# Patient Record
Sex: MALE | Race: AMERICAN INDIAN OR ALASKA NATIVE
[De-identification: names, ages, dates, MRNs, and addresses within clinical notes are randomized per-mention and may not be internally consistent; named-entity substitution may affect disease eponyms.]

---

## 2021-07-31 ENCOUNTER — HOSPITAL ENCOUNTER (INPATIENT)
Dept: HOSPITAL 5 - ED | Age: 31
LOS: 5 days | Discharge: HOME | DRG: 557 | End: 2021-08-05
Attending: INTERNAL MEDICINE | Admitting: HOSPITALIST
Payer: OTHER GOVERNMENT

## 2021-07-31 DIAGNOSIS — N17.0: ICD-10-CM

## 2021-07-31 DIAGNOSIS — M62.82: Primary | ICD-10-CM

## 2021-07-31 DIAGNOSIS — Z79.01: ICD-10-CM

## 2021-07-31 DIAGNOSIS — Z79.891: ICD-10-CM

## 2021-07-31 DIAGNOSIS — Z88.9: ICD-10-CM

## 2021-07-31 DIAGNOSIS — E86.0: ICD-10-CM

## 2021-07-31 DIAGNOSIS — Z79.899: ICD-10-CM

## 2021-07-31 DIAGNOSIS — F23: ICD-10-CM

## 2021-07-31 DIAGNOSIS — R79.89: ICD-10-CM

## 2021-07-31 DIAGNOSIS — F39: ICD-10-CM

## 2021-07-31 LAB
ALBUMIN SERPL-MCNC: 4.7 G/DL (ref 3.9–5)
ALT SERPL-CCNC: 51 UNITS/L (ref 7–56)
BASOPHILS # (AUTO): 0 K/MM3 (ref 0–0.1)
BASOPHILS NFR BLD AUTO: 0.2 % (ref 0–1.8)
BUN SERPL-MCNC: 23 MG/DL (ref 9–20)
BUN SERPL-MCNC: 24 MG/DL (ref 9–20)
BUN/CREAT SERPL: 13 %
BUN/CREAT SERPL: 14 %
CALCIUM SERPL-MCNC: 10.1 MG/DL (ref 8.4–10.2)
CALCIUM SERPL-MCNC: 8.3 MG/DL (ref 8.4–10.2)
EOSINOPHIL # BLD AUTO: 0.1 K/MM3 (ref 0–0.4)
EOSINOPHIL NFR BLD AUTO: 0.6 % (ref 0–4.3)
HCT VFR BLD CALC: 48.6 % (ref 35.5–45.6)
HEMOLYSIS INDEX: 14
HEMOLYSIS INDEX: 15
HGB BLD-MCNC: 15.6 GM/DL (ref 11.8–15.2)
LYMPHOCYTES # BLD AUTO: 1.7 K/MM3 (ref 1.2–5.4)
LYMPHOCYTES NFR BLD AUTO: 17.5 % (ref 13.4–35)
MCHC RBC AUTO-ENTMCNC: 32 % (ref 32–34)
MCV RBC AUTO: 86 FL (ref 84–94)
MONOCYTES # (AUTO): 0.7 K/MM3 (ref 0–0.8)
MONOCYTES % (AUTO): 7.7 % (ref 0–7.3)
PLATELET # BLD: 295 K/MM3 (ref 140–440)
RBC # BLD AUTO: 5.64 M/MM3 (ref 3.65–5.03)

## 2021-07-31 PROCEDURE — U0003 INFECTIOUS AGENT DETECTION BY NUCLEIC ACID (DNA OR RNA); SEVERE ACUTE RESPIRATORY SYNDROME CORONAVIRUS 2 (SARS-COV-2) (CORONAVIRUS DISEASE [COVID-19]), AMPLIFIED PROBE TECHNIQUE, MAKING USE OF HIGH THROUGHPUT TECHNOLOGIES AS DESCRIBED BY CMS-2020-01-R: HCPCS

## 2021-07-31 PROCEDURE — 85025 COMPLETE CBC W/AUTO DIFF WBC: CPT

## 2021-07-31 PROCEDURE — 80048 BASIC METABOLIC PNL TOTAL CA: CPT

## 2021-07-31 PROCEDURE — 82550 ASSAY OF CK (CPK): CPT

## 2021-07-31 PROCEDURE — 36415 COLL VENOUS BLD VENIPUNCTURE: CPT

## 2021-07-31 PROCEDURE — 80053 COMPREHEN METABOLIC PANEL: CPT

## 2021-07-31 PROCEDURE — 71045 X-RAY EXAM CHEST 1 VIEW: CPT

## 2021-07-31 PROCEDURE — 87641 MR-STAPH DNA AMP PROBE: CPT

## 2021-07-31 PROCEDURE — 80320 DRUG SCREEN QUANTALCOHOLS: CPT

## 2021-07-31 PROCEDURE — G0378 HOSPITAL OBSERVATION PER HR: HCPCS

## 2021-07-31 PROCEDURE — 76770 US EXAM ABDO BACK WALL COMP: CPT

## 2021-07-31 PROCEDURE — G0480 DRUG TEST DEF 1-7 CLASSES: HCPCS

## 2021-07-31 NOTE — EMERGENCY DEPARTMENT REPORT
HPI





- General


Chief Complaint: Altered Mental Status


Time Seen by Provider: 07/31/21 15:55





- HPI


HPI: 





This is a 30-year-old -American male who presents to the emergency 

department via EMS for a medical and mental health evaluation after he was brou

ght in for excited delirium.  The patient was seen at work on Tuesday, as a 

[facility] , and [facility] EMS is at bedside after bringing him 

in today.  Apparently the patient did not call in for his part-time job on 

Wednesday.  He spent most of Wednesday, Thursday, Friday in his room.  Today, 

his mother found him standing in the hallway naked just staring.  She called for

EMS and when they arrived he was seen laying in the fetal position in the 

bathroom.  They mentioned about bringing him to the emergency department and he 

became very agitated and aggressive and required 5 mg of Haldol, 5 mg of Versed,

and 50 mg of Benadryl to sedate him and get him to the emergency department.  

The patient has been in the emergency department for about 50 minutes prior to 

my shift starting.  I immediately went into see this patient in room #22 and got

some history from EMS at bedside.  The patient was arousable.  As soon as I 

explained that the patient was brought in for an evaluation with concern for his

mental state, the patient sat up and pulled off all of his monitoring, pulse ox 

and blood pressure cuff.  Initially the patient was unable to tell me the 

appropriate year, but says it was because he just woke up after receiving these 

medications.  After a few minutes the patient was able to tell me the year and 

the current president, and answer orientation questions appropriately.  EMS also

tells me that the mother has some kind of power of .  As we were getting

the mother back to give more collateral information and explain the power of 

 status, the patient eloped from the emergency department.  Our security

was called but had not yet arrived.  [facility] police have been called.  





The patient was seen here in April of this year for a mental health evaluation. 

At that time the patient presented as part of a 1013 that [place] [employer].  At that time he had admitted

to my colleague that he had a history of a mood disorder and was told that he 

was bipolar.  During that emergency department visit, the patient was 

transferred to a psychiatric facility.





If the patient is found and brought back to the emergency department, he will be

a 1013 and ED hold.





I was able to speak with the patient's mother.  She did show me appropriate 

power of  for medical decision making.  She was able to give collateral 

information that does mirror the story told to me by [facility].  Audie arguelles says that once the patient came back Wednesday morning, he did not say another

word until he spoke to me in the emergency department.  She states that he has 

not been acting right.  The patient was found and is currently down the hill at 

[facility] station #1.  Mom says that she would like to 

avoid having the patient brought in by the police and has requested the patient 

receive medication to get him back into the emergency department.  Mom also says

that the patient was previously at [facility] and she will not allow him to

go back to [place] as she feels that he, and her, were not treated appropriately.





ED Past Medical Hx





- Social History


Smoking Status: Never Smoker





ED Review of Systems


ROS: 


Stated complaint: MH EVAL


Other details as noted in HPI





Comment: Unobtainable due to pts medical conditions





Physical Exam





- Physical Exam


Vital Signs: 


                                   Vital Signs











  07/31/21





  15:55


 


O2 Sat by Pulse 100





Oximetry 











Physical Exam: 





GENERAL: The patient is well-developed well-nourished.


HENT: Normocephalic.  Atraumatic.    Patient has moist mucous membranes.


EYES: Extraocular motions are intact.  Pupils equal reactive to light bilatera

lly.


NECK: Supple. Trachea is midline.


CHEST/LUNGS: Clear to auscultation.  There is no respiratory distress noted.


HEART/CARDIOVASCULAR: Regular.  There is no tachycardia.  There is no murmur.


ABDOMEN: Abdomen is soft, nontender.  Patient has normal bowel sounds.  


SKIN: Skin is warm and dry. 


NEURO: Patient is awake and alert.  He is not cooperative.  No obvious motor or 

focal deficits.


MUSCULOSKELETAL: There is no tenderness or deformity.  There is no limitation 

range of motion.  





ED Course


                                   Vital Signs











  07/31/21





  15:55


 


O2 Sat by Pulse 100





Oximetry 














- Reevaluation(s)


Reevaluation #1: 





07/31/21 17:30


The patient was down at the [facility], station #1.  

Initially his family, as well as the fire department, were able to talk with the

patient and get him to return to the emergency department without any further 

medication given.  However, as the patient was walking back towards the 

psychiatric area of the emergency department, he turned around and attempted to 

elope again.  The patient was given 5 mg of Haldol, 2 mg of Ativan, and 25 mg of

Benadryl.  The patient is now back in room #14 and is resting comfortably.  

Blood work has been obtained.  The patient is on a 1013 and an ED hold.


Reevaluation #2: 





07/31/21 19:46


The patient CK level came back at about 2000.  He has some mild renal 

insufficiency with a GFR of 50.  There is a slight elevation in the AST level.  

We do not yet have a urinalysis.  I spoke to the patient and while he may still 

have some sedation from the previous medications, he has initially agreed for an

IV to be placed and to receive IV fluid.  After the patient receives the IV 

fluid we will repeat the CK level and BMP to see if the patient can be medically

cleared versus needing a medical admission.





ED Medical Decision Making





- Lab Data


Result diagrams: 


                                 07/31/21 17:55





                                 07/31/21 22:30





                                   Lab Results











  07/31/21 07/31/21 07/31/21 Range/Units





  17:55 17:55 17:55 


 


WBC  9.6    (4.5-11.0)  K/mm3


 


RBC  5.64 H    (3.65-5.03)  M/mm3


 


Hgb  15.6 H    (11.8-15.2)  gm/dl


 


Hct  48.6 H    (35.5-45.6)  %


 


MCV  86    (84-94)  fl


 


MCH  28    (28-32)  pg


 


MCHC  32    (32-34)  %


 


RDW  13.5    (13.2-15.2)  %


 


Plt Count  295    (140-440)  K/mm3


 


Lymph % (Auto)  17.5    (13.4-35.0)  %


 


Mono % (Auto)  7.7 H    (0.0-7.3)  %


 


Eos % (Auto)  0.6    (0.0-4.3)  %


 


Baso % (Auto)  0.2    (0.0-1.8)  %


 


Lymph # (Auto)  1.7    (1.2-5.4)  K/mm3


 


Mono # (Auto)  0.7    (0.0-0.8)  K/mm3


 


Eos # (Auto)  0.1    (0.0-0.4)  K/mm3


 


Baso # (Auto)  0.0    (0.0-0.1)  K/mm3


 


Seg Neutrophils %  74.0 H    (40.0-70.0)  %


 


Seg Neutrophils #  7.1    (1.8-7.7)  K/mm3


 


Sodium   136 L   (137-145)  mmol/L


 


Potassium   3.4 L   (3.6-5.0)  mmol/L


 


Chloride   96.0 L   ()  mmol/L


 


Carbon Dioxide   13 L   (22-30)  mmol/L


 


Anion Gap   30   mmol/L


 


BUN   24 H   (9-20)  mg/dL


 


Creatinine   1.9 H   (0.8-1.3)  mg/dL


 


Estimated GFR   50   ml/min


 


BUN/Creatinine Ratio   13   %


 


Glucose   113 H   ()  mg/dL


 


Calcium   10.1   (8.4-10.2)  mg/dL


 


Total Bilirubin   1.10   (0.1-1.2)  mg/dL


 


AST   76 H   (5-40)  units/L


 


ALT   51   (7-56)  units/L


 


Alkaline Phosphatase   58   ()  units/L


 


Total Creatine Kinase     ()  units/L


 


Total Protein   7.8   (6.3-8.2)  g/dL


 


Albumin   4.7   (3.9-5)  g/dL


 


Albumin/Globulin Ratio   1.5   %


 


Plasma/Serum Alcohol    < 0.01  (0-0.07)  %














  07/31/21 07/31/21 07/31/21 Range/Units





  17:55 22:30 22:30 


 


WBC     (4.5-11.0)  K/mm3


 


RBC     (3.65-5.03)  M/mm3


 


Hgb     (11.8-15.2)  gm/dl


 


Hct     (35.5-45.6)  %


 


MCV     (84-94)  fl


 


MCH     (28-32)  pg


 


MCHC     (32-34)  %


 


RDW     (13.2-15.2)  %


 


Plt Count     (140-440)  K/mm3


 


Lymph % (Auto)     (13.4-35.0)  %


 


Mono % (Auto)     (0.0-7.3)  %


 


Eos % (Auto)     (0.0-4.3)  %


 


Baso % (Auto)     (0.0-1.8)  %


 


Lymph # (Auto)     (1.2-5.4)  K/mm3


 


Mono # (Auto)     (0.0-0.8)  K/mm3


 


Eos # (Auto)     (0.0-0.4)  K/mm3


 


Baso # (Auto)     (0.0-0.1)  K/mm3


 


Seg Neutrophils %     (40.0-70.0)  %


 


Seg Neutrophils #     (1.8-7.7)  K/mm3


 


Sodium   137   (137-145)  mmol/L


 


Potassium   4.1  D   (3.6-5.0)  mmol/L


 


Chloride   105.6   ()  mmol/L


 


Carbon Dioxide   22  D   (22-30)  mmol/L


 


Anion Gap   14   mmol/L


 


BUN   23 H   (9-20)  mg/dL


 


Creatinine   1.7 H   (0.8-1.3)  mg/dL


 


Estimated GFR   57   ml/min


 


BUN/Creatinine Ratio   14   %


 


Glucose   84   ()  mg/dL


 


Calcium   8.3 L D   (8.4-10.2)  mg/dL


 


Total Bilirubin     (0.1-1.2)  mg/dL


 


AST     (5-40)  units/L


 


ALT     (7-56)  units/L


 


Alkaline Phosphatase     ()  units/L


 


Total Creatine Kinase  1925 H   3416 H  ()  units/L


 


Total Protein     (6.3-8.2)  g/dL


 


Albumin     (3.9-5)  g/dL


 


Albumin/Globulin Ratio     %


 


Plasma/Serum Alcohol     (0-0.07)  %














- Medical Decision Making





The patient was initially brought in by EMS after his mother called 911 for what

appears to be some psychosis.  As previously stated, the patient became very 

agitated and aggressive when EMS arrived and discussed coming to the hospital.  

The patient required multiple medications for excited delirium.  Initially I saw

the patient in bed #21.  Despite the sedation, the patient was easily arousable.

 After introducing myself as the emergency department physician and discussing 

both a medical and psychiatric evaluation, the patient once again became very 

agitated, began cursing at myself and ER staff, and then eloped from the 

emergency department.  The patient was followed by his [facility] colleagues and they 

were able to get him into station #1 just down the hill.  Then, along with his 

parents, they were able to get him to return to the emergency department.  

However, he did require another dose of medications once he saw that he was 

going to the psychiatric portion of the emergency department.  The patient was 

placed on a 1013 and ED hold for what appears to be acute psychosis.





The patient's blood work came back showing some renal insufficiency with a 

creatinine of 1.9, GFR of 50.  He had some elevation in his AST, and the patient

had some hypokalemia.  With these lab values, and the fact the patient remained 

in his room at his home for about 3 days, followed by some excited delirium 

requiring medications, I had concern for possible rhabdomyolysis.  His initial 

CK level came back at 1900.  The patient was then given 2 L of IV fluid.  His 

labs were rechecked and while the renal insufficiency improved, his CK level 

went up to 3400.  I have concerned that his CK level will continue to increase 

over the next 24 to 48 hours.  The patient will need further IV fluid resuscit

ation, repeat labs to monitor his kidney function and CK level, and therefore is

not able to be medically cleared for psychiatric placement at this time.  The 

patient will be a medical admission but will remain as a 1013 and will be seen 

by the psychiatric team for further evaluation and treatment and possible 

placement.  The patient has been accepted for admission by the hospitalist, Dr. Winn.  I also spoke with the patient's mother, his power of , and 

updated her regarding the medical admission.


Critical Care Time: No


Critical care attestation.: 


If time is entered above; I have spent that time in minutes in the direct care 

of this critically ill patient, excluding procedure time.








ED Disposition


Clinical Impression: 


 Rhabdomyolysis, Mild renal insufficiency, Acute psychosis, LFT elevation, 

Dehydration





Disposition: DC-09 OP ADMIT IP TO THIS HOSP


Is pt being admited?: Yes


Condition: Fair


Time of Disposition: 00:12

## 2021-08-01 RX ADMIN — FAMOTIDINE SCH MG: 20 TABLET ORAL at 22:00

## 2021-08-01 RX ADMIN — SODIUM CHLORIDE SCH MLS/HR: 0.9 INJECTION, SOLUTION INTRAVENOUS at 16:19

## 2021-08-01 RX ADMIN — FLUOXETINE SCH MG: 10 CAPSULE ORAL at 13:11

## 2021-08-01 RX ADMIN — Medication SCH ML: at 10:37

## 2021-08-01 RX ADMIN — HEPARIN SODIUM SCH: 5000 INJECTION, SOLUTION INTRAVENOUS; SUBCUTANEOUS at 10:37

## 2021-08-01 RX ADMIN — FAMOTIDINE SCH MG: 20 TABLET ORAL at 10:37

## 2021-08-01 RX ADMIN — Medication SCH ML: at 22:00

## 2021-08-01 NOTE — XRAY REPORT
CHEST 1 VIEW



INDICATION: 

Altered mental status.



COMPARISON: 

None.



FINDINGS:



Support devices: None.

Heart: Normal. 

Lungs/Pleura: No acute pulmonary or pleural findings.  







IMPRESSION:

1. No acute findings.



Signer Name: Harry Edwards MD 

Signed: 8/1/2021 8:17 AM

Workstation Name: [id number]

## 2021-08-01 NOTE — HISTORY AND PHYSICAL REPORT
History of Present Illness


Date of examination: 08/01/21


Date of admission: 


08/01/2021


Chief complaint: 





Altered mental status


History of present illness: 





30-year-old -American male who presents to the emergency department via 

EMS for a medical and mental health evaluation after he was brought in for 

excited delirium.  The patient was seen at work on Tuesday, as a [facility] 

, and [facility] EMS is at bedside after bringing him in today.  Norma

arently the patient did not call in for his part-time job on Wednesday.  He 

spent most of Wednesday, Thursday, Friday in his room.  Today, his mother found 

him standing in the hallway naked just staring.  She called for EMS and when 

they arrived he was seen laying in the fetal position in the bathroom.  They 

mentioned about bringing him to the emergency department and he became very 

agitated and aggressive and required 5 mg of Haldol, 5 mg of Versed, and 50 mg 

of Benadryl to sedate him and get him to the emergency department.  The patient 

has been in the emergency department for about 50 minutes prior to my shift 

starting.  I immediately went into see this patient in room #22 and got some hi

story from EMS at bedside.  The patient was arousable.  As soon as I explained 

that the patient was brought in for an evaluation with concern for his mental 

state, the patient sat up and pulled off all of his monitoring, pulse ox and 

blood pressure cuff.  Initially the patient was unable to tell me the 

appropriate year, but says it was because he just woke up after receiving these 

medications.  After a few minutes the patient was able to tell me the year and 

the current president, and answer orientation questions appropriately.  EMS also

tells me that the mother has some kind of power of .  As we were getting

the mother back to give more collateral information and explain the power of 

 status, the patient eloped from the emergency department.  Our security

was called but had not yet arrived.  [facility] police have been called.  





The patient was seen here in April of this year for a mental health evaluation. 

At that time the patient presented as part of a 1013 that was done [place] [employer].  At that time he had admitted

to my colleague that he had a history of a mood disorder and was told that he 

was bipolar.  During that emergency department visit, the patient was 

transferred to a psychiatric facility.





In the emergency room patient is found to have 1013.  Also patient found to have

rhabdomyolysis CKs 3416, BUN 23 creatinine 1.7





Past History


Past Medical History: other (Psychosis, bipolar disorder)





Medications and Allergies


                                    Allergies











Allergy/AdvReac Type Severity Reaction Status Date / Time


 


pollen extracts Allergy Mild Unknown Verified 04/02/21 13:58











Active Meds: 


Active Medications





Acetaminophen (Acetaminophen 325 Mg Tab)  650 mg PO Q4H PRN


   PRN Reason: Pain MILD(1-3)/Fever >100.5/HA


Albuterol (Albuterol 2.5 Mg/3 Ml Nebu)  2.5 mg IH Q4HRT PRN


   PRN Reason: Shortness Of Breath


Famotidine (Famotidine 20 Mg Tab)  20 mg PO BID JULI


Heparin Sodium (Porcine) (Heparin 5,000 Unit/1 Ml Vial)  5,000 unit SUB-Q Q12HR 

JULI


Sodium Chloride (Nacl 0.9% 1000 Ml)  1,000 mls @ 999 mls/hr IV BOLUS ONE


   Stop: 08/01/21 00:48


Sodium Chloride (Nacl 0.45% 1000 Ml)  1,000 mls @ 125 mls/hr IV AS DIRECT JULI


Ondansetron HCl (Ondansetron 4 Mg/2 Ml Inj)  4 mg IV Q8H PRN


   PRN Reason: Nausea And Vomiting


Sodium Chloride (Sodium Chloride 0.9% 10 Ml Flush Syringe)  10 ml IV BID JULI


Sodium Chloride (Sodium Chloride 0.9% 10 Ml Flush Syringe)  10 ml IV PRN PRN


   PRN Reason: LINE FLUSH











Review of Systems


Constitutional: other (Altered mental status)


Neurological: change in mentation


Psychiatric: anxiety





Exam





- Constitutional


Vitals: 


                                        











Temp Pulse Resp BP Pulse Ox


 


    103 H  13   92/43   98 


 


    07/31/21 16:00  07/31/21 16:00  07/31/21 16:00  07/31/21 16:00











General appearance: Present: no acute distress, well-nourished





- EENT


Eyes: Present: PERRL


ENT: hearing intact, clear oral mucosa





- Neck


Neck: Present: supple, normal ROM





- Respiratory


Respiratory effort: normal


Respiratory: bilateral: CTA





- Cardiovascular


Heart Sounds: Present: S1 & S2.  Absent: rub, click





- Extremities


Extremities: pulses symmetrical, No edema


Peripheral Pulses: within normal limits





- Abdominal


General gastrointestinal: Present: soft, non-tender, non-distended, normal bowel

sounds


Male genitourinary: Present: normal





- Integumentary


Integumentary: Present: clear, warm, dry





- Musculoskeletal


Musculoskeletal: gait normal, strength equal bilaterally





- Psychiatric


Psychiatric: appropriate mood/affect, intact judgment & insight





- Neurologic


Neurologic: CNII-XII intact, moves all extremities





Results





- Labs


CBC & Chem 7: 


                                 07/31/21 17:55





                                 07/31/21 22:30


Labs: 


                             Laboratory Last Values











WBC  9.6 K/mm3 (4.5-11.0)   07/31/21  17:55    


 


RBC  5.64 M/mm3 (3.65-5.03)  H  07/31/21  17:55    


 


Hgb  15.6 gm/dl (11.8-15.2)  H  07/31/21  17:55    


 


Hct  48.6 % (35.5-45.6)  H  07/31/21  17:55    


 


MCV  86 fl (84-94)   07/31/21  17:55    


 


MCH  28 pg (28-32)   07/31/21  17:55    


 


MCHC  32 % (32-34)   07/31/21  17:55    


 


RDW  13.5 % (13.2-15.2)   07/31/21  17:55    


 


Plt Count  295 K/mm3 (140-440)   07/31/21  17:55    


 


Lymph % (Auto)  17.5 % (13.4-35.0)   07/31/21  17:55    


 


Mono % (Auto)  7.7 % (0.0-7.3)  H  07/31/21  17:55    


 


Eos % (Auto)  0.6 % (0.0-4.3)   07/31/21  17:55    


 


Baso % (Auto)  0.2 % (0.0-1.8)   07/31/21  17:55    


 


Lymph # (Auto)  1.7 K/mm3 (1.2-5.4)   07/31/21  17:55    


 


Mono # (Auto)  0.7 K/mm3 (0.0-0.8)   07/31/21  17:55    


 


Eos # (Auto)  0.1 K/mm3 (0.0-0.4)   07/31/21  17:55    


 


Baso # (Auto)  0.0 K/mm3 (0.0-0.1)   07/31/21  17:55    


 


Seg Neutrophils %  74.0 % (40.0-70.0)  H  07/31/21  17:55    


 


Seg Neutrophils #  7.1 K/mm3 (1.8-7.7)   07/31/21  17:55    


 


Sodium  137 mmol/L (137-145)   07/31/21  22:30    


 


Potassium  4.1 mmol/L (3.6-5.0)  D 07/31/21  22:30    


 


Chloride  105.6 mmol/L ()   07/31/21  22:30    


 


Carbon Dioxide  22 mmol/L (22-30)  D 07/31/21  22:30    


 


Anion Gap  14 mmol/L  07/31/21  22:30    


 


BUN  23 mg/dL (9-20)  H  07/31/21  22:30    


 


Creatinine  1.7 mg/dL (0.8-1.3)  H  07/31/21  22:30    


 


Estimated GFR  57 ml/min  07/31/21  22:30    


 


BUN/Creatinine Ratio  14 %  07/31/21  22:30    


 


Glucose  84 mg/dL ()   07/31/21  22:30    


 


Calcium  8.3 mg/dL (8.4-10.2)  L D 07/31/21  22:30    


 


Total Bilirubin  1.10 mg/dL (0.1-1.2)   07/31/21  17:55    


 


AST  76 units/L (5-40)  H  07/31/21  17:55    


 


ALT  51 units/L (7-56)   07/31/21  17:55    


 


Alkaline Phosphatase  58 units/L ()   07/31/21  17:55    


 


Total Creatine Kinase  3416 units/L ()  H  07/31/21  22:30    


 


Total Protein  7.8 g/dL (6.3-8.2)   07/31/21  17:55    


 


Albumin  4.7 g/dL (3.9-5)   07/31/21  17:55    


 


Albumin/Globulin Ratio  1.5 %  07/31/21  17:55    


 


Plasma/Serum Alcohol  < 0.01 % (0-0.07)   07/31/21  17:55    














Assessment and Plan


VTE prophylaxis?: Chemical


Plan of care discussed with patient/family: Yes





- Patient Problems


(1) Rhabdomyolysis


Current Visit: Yes   Status: Acute   


Plan to address problem: 


Admit the patient to the medical floor.  Regular diet.  Half-normal saline at 

the rate of 125 cc/h.  We will recheck the CK in the morning.  Repeat BMP in the

morning.  Nephrology consult








(2) Acute psychosis


Current Visit: Yes   Status: Acute   


Plan to address problem: 


Admit the patient to the medical floor.  Patient is 1013.  We consults 

psychiatry evaluation.  Medication as per psych recommendation








(3) Dehydration


Current Visit: Yes   Status: Acute   


Plan to address problem: 


Half-normal saline at the rate of 125 cc/h.  We will recheck the CK in the 

morning.  Repeat BMP in the morning.  Nephrology consult








(4) LFT elevation


Current Visit: Yes   Status: Acute   


Plan to address problem: 


We will recheck the CMP in the morning if needed will consult GI








(5) CIARRA (acute kidney injury)


Current Visit: Yes   Status: Acute   


Plan to address problem: 


Half-normal saline at the rate of 125 cc/h.  Avoid nephrotoxic drug.  Renally 

dose medication.  Repeat BMP in the morning.  Nephrology consult








(6) DVT prophylaxis


Current Visit: Yes   Status: Acute   


Plan to address problem: 


Heparin 5000 units subcu every 12 hours for DVT prophylaxis.  Pepcid 20 mg p.o. 

twice daily for GI prophylaxis.  Patient is a full code

## 2021-08-01 NOTE — CONSULTATION
History of Present Illness





- Reason for Consult


Consult date: 08/01/21


acute renal failure





- History of Present Illness





30-year-old -American male who presents to the emergency department via 

EMS for a medical and mental health evaluation after he was brought in for 

excited delirium.  in the ER he became very agitated and aggressive and required

5 mg of Haldol, 5 mg of Versed, and 50 mg of Benadryl to sedate him and get him 

to the emergency department. 





In the emergency room patient is found to have 1013.  Also patient found to have

rhabdomyolysis CKs 3416, BUN 23 creatinine 1.7 and renal consult was requested








Past History


Past Medical History: other (Psychosis, bipolar disorder)





Medications and Allergies


                                    Allergies











Allergy/AdvReac Type Severity Reaction Status Date / Time


 


pollen extracts Allergy Mild Unknown Verified 04/02/21 13:58











Active Meds: 


Active Medications





Acetaminophen (Acetaminophen 325 Mg Tab)  650 mg PO Q4H PRN


   PRN Reason: Pain MILD(1-3)/Fever >100.5/HA


Albuterol (Albuterol 2.5 Mg/3 Ml Nebu)  2.5 mg IH Q4HRT PRN


   PRN Reason: Shortness Of Breath


Famotidine (Famotidine 20 Mg Tab)  20 mg PO BID JULI


Heparin Sodium (Porcine) (Heparin 5,000 Unit/1 Ml Vial)  5,000 unit SUB-Q Q12HR 

JULI


Sodium Chloride (Nacl 0.45% 1000 Ml)  1,000 mls @ 125 mls/hr IV AS DIRECT JULI


Ondansetron HCl (Ondansetron 4 Mg/2 Ml Inj)  4 mg IV Q8H PRN


   PRN Reason: Nausea And Vomiting


Sodium Chloride (Sodium Chloride 0.9% 10 Ml Flush Syringe)  10 ml IV BID JULI


Sodium Chloride (Sodium Chloride 0.9% 10 Ml Flush Syringe)  10 ml IV PRN PRN


   PRN Reason: LINE FLUSH











Review of Systems


ROS unobtainable: due to mental status





Exam





- Vital Signs


Vital signs: 


                                   Vital Signs











Pulse Ox


 


 100 


 


 07/31/21 15:22














- General Appearance


General appearance: well-developed, well-nourished


EENT: ATNC, mucous membranes dry


Neck: Present: neck supple


Respiratory: Clear to Ascultation


Heart: tachycardia


Gastrointestinal: Present: normoactive bowel sounds


Integumentary: no rash, warm and dry


Neurologic: other (does not follow commands)


Musculoskeletal: Present: other (no edema in BLE)





Results





- Lab Results





                                 07/31/21 17:55





                                 07/31/21 22:30


                             Most recent lab results











Calcium  8.3 mg/dL (8.4-10.2)  L D 07/31/21  22:30    














Assessment and Plan


(1) Rhabdomyolysis


(2) Acute psychosis


(3) Dehydration


(4) LFT elevation


(5) CIARRA (acute kidney injury)








kidney function is normal at baseline, CIARRA 2/2 prerenal azotemia


cont  cc/h


will check renal US


will check urine lytes and protein


will cont to monitor CK, it is trending down


renally dose meds


strict I&O


daily weight





Asif Fajardo MD


[phone]

## 2021-08-01 NOTE — ULTRASOUND REPORT
ULTRASOUND RENAL



INDICATION:

renal failure.



COMPARISON:

No relevant prior imaging study available. 



FINDINGS:

RIGHT KIDNEY: 

Size: 11.2 cm. 

Echogenicity: Increased. 

Cortical thickness: Normal.

Stones: None. 

Hydronephrosis: None. 

Cyst or mass: None.  



LEFT KIDNEY: 

Size: 11.1 cm. 

Echogenicity: Increased. 

Cortical thickness: Normal. 

Stones: None. 

Hydronephrosis: None. 

Cyst or mass: None.  



Urinary Bladder: No significant abnormality.

Free Fluid: None.

Additional Findings: None.



IMPRESSION

1. No acute sonographic abnormality of the kidneys. Increased echogenicity in the cortex of both kidn
eys can be seen in the setting of medical renal disease.



Signer Name: Harry Edwards MD 

Signed: 8/1/2021 11:17 AM

Workstation Name: [id number]

## 2021-08-01 NOTE — EVENT NOTE
Date: 08/01/21


Patient presented with altered mental status, acute kidney injury, 

rhabdomyolysis. I have seen and examined him. Continue current management. Psych

consulted.

## 2021-08-01 NOTE — CONSULTATION
History of Present Illness





- Reason for Consult


Consult date: 08/01/21


Reason for consult: AMS





- History of Present Psychiatric Illness


Per ER Note: This is a 30-year-old -American male who presents to the 

emergency department via EMS for a medical and mental health evaluation after he

was brought in for excited delirium.  The patient was seen at work on Tuesday, 

as a [facility] , and [facility] EMS is at bedside after bringing

him in today.  Apparently the patient did not call in for his part-time job on 

Wednesday.  He spent most of Wednesday, Thursday, Friday in his room.  Today, 

his mother found him standing in the hallway naked just staring.  She called for

EMS and when they arrived he was seen laying in the fetal position in the 

bathroom.  They mentioned about bringing him to the emergency department and he 

became very agitated and aggressive and required 5 mg of Haldol, 5 mg of Versed,

and 50 mg of Benadryl to sedate him and get him to the emergency department.  

The patient has been in the emergency department for about 50 minutes prior to 

my shift starting.  I immediately went into see this patient in room #22 and got

some history from EMS at bedside.  The patient was arousable.  As soon as I 

explained that the patient was brought in for an evaluation with concern for his

mental state, the patient sat up and pulled off all of his monitoring, pulse ox 

and blood pressure cuff.  Initially the patient was unable to tell me the 

appropriate year, but says it was because he just woke up after receiving these 

medications.  After a few minutes the patient was able to tell me the year and 

the current president, and answer orientation questions appropriately.  EMS also

tells me that the mother has some kind of power of .  As we were getting

the mother back to give more collateral information and explain the power of 

 status, the patient eloped from the emergency department.  Our security

was called but had not yet arrived.  [facility] police have been called.  





During my evaluation of 31y/o Saleem Lockwood, he is lying in n his back staring

at the ceiling. He doesn't look at me or acknowledge me when I walk up. The 

patient does speak to me but he continues to stare at the ceiling. He is a/o x 

3. He doesn't appear to be forthcoming about what is really going on with him. 

He says his mom called 911 but he's unsure why. He says "she did it without 

speaking to me." I asked the patient why did his mother think he needed medical 

attention, he replies "I don't know. I'm not sure why she would call 911." I ask

him what was going on at the time when 911 was called. The patient says "I was 

in the bathroom praying out loud to God. It wasn't meant for her to hear." When 

asking the patient what he was praying out loud about he states "I don't 

remember. It was for God." The patient denies hallucinations when asked, but I 

ask him why is he staring at the ceiling and not looking at me, he replies "I'm 

looking at God. I only need to see him." The patient denies SI/HI. He says he 

did have an attempt "in his college days." He then says "I have felt suicidal on

up til now." When asking the patient again was he suicidal he replies "no." He 

says he was recently admitted to [facility] on Good Friday of this year he 

says for Mood Disorder. He denies taking any medications, he says "because I 

don't need them."





Diagnoses: Mood disorder


Suicide attempts or Self-harm behavior: Yes


Prior psychiatric hospitalizations: Yes


Substance Abuse history: Denies


Previous psychiatric medications tried: did not take them


Outpatient treatment: Denies





PAST MEDICAL HISTORY: None reported





Family Psychiatric History: None reported or documented





SOCIAL HISTORY


Marital Status: Single


Living Arrangements: with mother


Employment Status: Employed


Access to guns/weapons: Denies 


Education: high school


History of Abuse: none reported


Legal History: none reported





REVIEW OF SYSTEMS


Constitutional: Negative for weight loss


ENT: Negative for stridor


Respiratory: Negative for cough or hemoptysis


All other systems reviewed and are negative


 


MENTAL STATUS EXAMINATION


General Appearance and Behavior: Age appropriate, good hygiene, wearing 

appropriate clothes, calm, cooperative, no eye contact


Cooperation: Participating, guarded


Psychomotor Behavior: normal


Mood: okay


Affect and affective range: Restricted


Thought Process: goal oriented


Thought Content: None


Speech: Normal volume, Regular rate and rhythm, 


Suicidal Ideation: Unclear


Homicidal Ideation: Denies 


Hallucinations: Visual


Delusions: None elicited


Impulse Control: Normal


Insight and Judgment: Limited insight and judgment


Memory: Limited


Attention: Limited


Orientation: Alert, oriented





 Assessment and Plan 


(1) Acute Psychosis





Treatment Plan


1013


Olanzapine 5mg po daily


Prozac 10mg po daily


Trazodone 50mg po qhs


Medical: Per primary


Sitter: Defer to primary


Disposition: Recommend acute psychiatric inpatient treatment. 


Will follow. Thanks.


Case staffed with Dr. Pederson. 








Medications and Allergies


                                    Allergies











Allergy/AdvReac Type Severity Reaction Status Date / Time


 


pollen extracts Allergy Mild Unknown Verified 04/02/21 13:58











Active Meds: 


Active Medications





Acetaminophen (Acetaminophen 325 Mg Tab)  650 mg PO Q4H PRN


   PRN Reason: Pain MILD(1-3)/Fever >100.5/HA


Albuterol (Albuterol 2.5 Mg/3 Ml Nebu)  2.5 mg IH Q4HRT PRN


   PRN Reason: Shortness Of Breath


Famotidine (Famotidine 20 Mg Tab)  20 mg PO BID [facility]


   Last Admin: 08/01/21 10:37 Dose:  20 mg


   Documented by: 


Heparin Sodium (Porcine) (Heparin 5,000 Unit/1 Ml Vial)  5,000 unit SUB-Q Q12HR 

[facility]


   Last Admin: 08/01/21 10:37 Dose:  Not Given


   Documented by: 


Sodium Chloride (Nacl 0.45% 1000 Ml)  1,000 mls @ 125 mls/hr IV AS DIRECT [facility]


Ondansetron HCl (Ondansetron 4 Mg/2 Ml Inj)  4 mg IV Q8H PRN


   PRN Reason: Nausea And Vomiting


Sodium Chloride (Sodium Chloride 0.9% 10 Ml Flush Syringe)  10 ml IV BID [facility]


   Last Admin: 08/01/21 10:37 Dose:  10 ml


   Documented by: 


Sodium Chloride (Sodium Chloride 0.9% 10 Ml Flush Syringe)  10 ml IV PRN PRN


   PRN Reason: LINE FLUSH











Mental Status Exam





- Vital signs


                                Last Vital Signs











Temp  98.6 F   08/01/21 08:38


 


Pulse  92 H  08/01/21 08:38


 


Resp  18   08/01/21 08:38


 


BP  119/72   08/01/21 08:38


 


Pulse Ox  95   08/01/21 09:24














Results


Result Diagrams: 


                                 07/31/21 17:55





                                 07/31/21 22:30


                              Abnormal lab results











  07/31/21 07/31/21 07/31/21 Range/Units





  17:55 17:55 17:55 


 


RBC  5.64 H    (3.65-5.03)  M/mm3


 


Hgb  15.6 H    (11.8-15.2)  gm/dl


 


Hct  48.6 H    (35.5-45.6)  %


 


Mono % (Auto)  7.7 H    (0.0-7.3)  %


 


Seg Neutrophils %  74.0 H    (40.0-70.0)  %


 


Sodium   136 L   (137-145)  mmol/L


 


Potassium   3.4 L   (3.6-5.0)  mmol/L


 


Chloride   96.0 L   ()  mmol/L


 


Carbon Dioxide   13 L   (22-30)  mmol/L


 


BUN   24 H   (9-20)  mg/dL


 


Creatinine   1.9 H   (0.8-1.3)  mg/dL


 


Glucose   113 H   ()  mg/dL


 


Calcium     (8.4-10.2)  mg/dL


 


AST   76 H   (5-40)  units/L


 


Total Creatine Kinase    1925 H  ()  units/L














  07/31/21 07/31/21 Range/Units





  22:30 22:30 


 


RBC    (3.65-5.03)  M/mm3


 


Hgb    (11.8-15.2)  gm/dl


 


Hct    (35.5-45.6)  %


 


Mono % (Auto)    (0.0-7.3)  %


 


Seg Neutrophils %    (40.0-70.0)  %


 


Sodium    (137-145)  mmol/L


 


Potassium    (3.6-5.0)  mmol/L


 


Chloride    ()  mmol/L


 


Carbon Dioxide    (22-30)  mmol/L


 


BUN  23 H   (9-20)  mg/dL


 


Creatinine  1.7 H   (0.8-1.3)  mg/dL


 


Glucose    ()  mg/dL


 


Calcium  8.3 L D   (8.4-10.2)  mg/dL


 


AST    (5-40)  units/L


 


Total Creatine Kinase   3416 H  ()  units/L








All other labs normal.

## 2021-08-02 LAB
ALBUMIN SERPL-MCNC: 3.7 G/DL (ref 3.9–5)
ALT SERPL-CCNC: 55 UNITS/L (ref 7–56)
BASOPHILS # (AUTO): 0 K/MM3 (ref 0–0.1)
BASOPHILS NFR BLD AUTO: 0.9 % (ref 0–1.8)
BUN SERPL-MCNC: 12 MG/DL (ref 9–20)
BUN/CREAT SERPL: 10 %
CALCIUM SERPL-MCNC: 8.8 MG/DL (ref 8.4–10.2)
EOSINOPHIL # BLD AUTO: 0.2 K/MM3 (ref 0–0.4)
EOSINOPHIL NFR BLD AUTO: 3.5 % (ref 0–4.3)
HCT VFR BLD CALC: 40.3 % (ref 35.5–45.6)
HEMOLYSIS INDEX: 8
HGB BLD-MCNC: 13.5 GM/DL (ref 11.8–15.2)
LYMPHOCYTES # BLD AUTO: 1.5 K/MM3 (ref 1.2–5.4)
LYMPHOCYTES NFR BLD AUTO: 31.9 % (ref 13.4–35)
MCHC RBC AUTO-ENTMCNC: 34 % (ref 32–34)
MCV RBC AUTO: 85 FL (ref 84–94)
MONOCYTES # (AUTO): 0.3 K/MM3 (ref 0–0.8)
MONOCYTES % (AUTO): 7.1 % (ref 0–7.3)
PLATELET # BLD: 231 K/MM3 (ref 140–440)
RBC # BLD AUTO: 4.74 M/MM3 (ref 3.65–5.03)

## 2021-08-02 RX ADMIN — FAMOTIDINE SCH MG: 20 TABLET ORAL at 22:26

## 2021-08-02 RX ADMIN — HEPARIN SODIUM SCH: 5000 INJECTION, SOLUTION INTRAVENOUS; SUBCUTANEOUS at 11:24

## 2021-08-02 RX ADMIN — HEPARIN SODIUM SCH: 5000 INJECTION, SOLUTION INTRAVENOUS; SUBCUTANEOUS at 08:25

## 2021-08-02 RX ADMIN — HEPARIN SODIUM SCH UNIT: 5000 INJECTION, SOLUTION INTRAVENOUS; SUBCUTANEOUS at 22:26

## 2021-08-02 RX ADMIN — FLUOXETINE SCH MG: 10 CAPSULE ORAL at 11:24

## 2021-08-02 RX ADMIN — Medication SCH ML: at 11:24

## 2021-08-02 RX ADMIN — FAMOTIDINE SCH MG: 20 TABLET ORAL at 11:24

## 2021-08-02 RX ADMIN — Medication SCH ML: at 22:26

## 2021-08-02 RX ADMIN — SODIUM CHLORIDE SCH MLS/HR: 0.9 INJECTION, SOLUTION INTRAVENOUS at 22:27

## 2021-08-02 NOTE — PROGRESS NOTE
Assessment and Plan


Assessment and plan: 








(1) Rhabdomyolysis


Current Visit: Yes   Status: Acute   


Plan to address problem: 


Admit the patient to the medical floor.  Regular diet.  Half-normal saline at 

the rate of 125 cc/h.  We will recheck the CK in the morning.  Repeat BMP in the

morning.  Nephrology consult








(2) Acute psychosis


Current Visit: Yes   Status: Acute   


Plan to address problem: 


Admit the patient to the medical floor.  Patient is 1013.  We consults 

psychiatry evaluation.  Medication as per psych recommendation








(3) Dehydration


Current Visit: Yes   Status: Acute   


Plan to address problem: 


Half-normal saline at the rate of 125 cc/h.  We will recheck the CK in the 

morning.  Repeat BMP in the morning.  Nephrology consult








(4) LFT elevation


Current Visit: Yes   Status: Acute   


Plan to address problem: 


We will recheck the CMP in the morning if needed will consult GI








(5) CIARRA (acute kidney injury) due to vasomotor nephropathy


Current Visit: Yes   Status: Acute   


Plan to address problem: 


Half-normal saline at the rate of 125 cc/h.  Avoid nephrotoxic drug.  Renally 

dose medication.  Repeat BMP in the morning.  Nephrology consult








(6) DVT prophylaxis


Current Visit: Yes   Status: Acute   


Plan to address problem: 


Heparin 5000 units subcu every 12 hours for DVT prophylaxis.  Pepcid 20 mg p.o. 

twice daily for GI prophylaxis.  Patient is a full code





8/2/21 Patient is 31 yo with history of psychosis, bipolar disorder. He 

presented with altered mental status. He is diagnosed with acute psychosis, 

rhabdomyolysis, dehydration, acute kidney injury due to vasomotor nephropathy.


He is on 1013. He says he wants to go home, but needs Psych re-eval.  Follow 

daily Creatinine kinase for rhabdomyolysis. He is on iv fluids fror 

rhabdomyolysis and CIARRA. Cr improved 1.2 today.








History


Interval history: 


patient presented with altered mental status, diagnosed with acute psychosis, 

rhabdomyolysis,CIARRA


He feels better today.


He tells me he wants to go home,


patient on 1013








Hospitalist Physical





- Physical exam


Narrative exam: 


Gen: Not in acute distress, lying in bed, has mask on


HEENT: Normocephalic, atraumatic


Neck : supple, no JVD


Heart:S1 and S2 reg, no murmurs, rubs or gallop


Lungs: clear to auscultation bilaterally, no wheeze


Abd: Soft , non tender, non distended, normal bowel sounds


Ext: No edema, no clubbing, no cyanosis


Neuro: Awake, alert, oriented X3, moves all ext











- Constitutional


Vitals: 


                                        











Temp Pulse Resp BP Pulse Ox


 


 97.2 F L  57 L  20   114/68   97 


 


 08/02/21 03:28  08/02/21 05:24  08/02/21 03:28  08/02/21 03:28  08/02/21 03:28











General appearance: Present: no acute distress, well-nourished





Results





- Labs


CBC & Chem 7: 


                                 08/02/21 04:37





                                 08/02/21 04:37


Labs: 


                             Laboratory Last Values











WBC  4.8 K/mm3 (4.5-11.0)   08/02/21  04:37    


 


RBC  4.74 M/mm3 (3.65-5.03)   08/02/21  04:37    


 


Hgb  13.5 gm/dl (11.8-15.2)   08/02/21  04:37    


 


Hct  40.3 % (35.5-45.6)  D 08/02/21  04:37    


 


MCV  85 fl (84-94)   08/02/21  04:37    


 


MCH  29 pg (28-32)   08/02/21  04:37    


 


MCHC  34 % (32-34)   08/02/21  04:37    


 


RDW  13.3 % (13.2-15.2)   08/02/21  04:37    


 


Plt Count  231 K/mm3 (140-440)   08/02/21  04:37    


 


Lymph % (Auto)  31.9 % (13.4-35.0)   08/02/21  04:37    


 


Mono % (Auto)  7.1 % (0.0-7.3)   08/02/21  04:37    


 


Eos % (Auto)  3.5 % (0.0-4.3)   08/02/21  04:37    


 


Baso % (Auto)  0.9 % (0.0-1.8)   08/02/21  04:37    


 


Lymph # (Auto)  1.5 K/mm3 (1.2-5.4)   08/02/21  04:37    


 


Mono # (Auto)  0.3 K/mm3 (0.0-0.8)   08/02/21  04:37    


 


Eos # (Auto)  0.2 K/mm3 (0.0-0.4)   08/02/21  04:37    


 


Baso # (Auto)  0.0 K/mm3 (0.0-0.1)   08/02/21  04:37    


 


Seg Neutrophils %  56.6 % (40.0-70.0)   08/02/21  04:37    


 


Seg Neutrophils #  2.7 K/mm3 (1.8-7.7)   08/02/21  04:37    


 


Sodium  140 mmol/L (137-145)   08/02/21  04:37    


 


Potassium  3.4 mmol/L (3.6-5.0)  L  08/02/21  04:37    


 


Chloride  107.4 mmol/L ()  H  08/02/21  04:37    


 


Carbon Dioxide  25 mmol/L (22-30)   08/02/21  04:37    


 


Anion Gap  11 mmol/L  08/02/21  04:37    


 


BUN  12 mg/dL (9-20)   08/02/21  04:37    


 


Creatinine  1.2 mg/dL (0.8-1.3)   08/02/21  04:37    


 


Estimated GFR  > 60 ml/min  08/02/21  04:37    


 


BUN/Creatinine Ratio  10 %  08/02/21  04:37    


 


Glucose  94 mg/dL ()   08/02/21  04:37    


 


Calcium  8.8 mg/dL (8.4-10.2)   08/02/21  04:37    


 


Total Bilirubin  0.40 mg/dL (0.1-1.2)   08/02/21  04:37    


 


AST  109 units/L (5-40)  H  08/02/21  04:37    


 


ALT  55 units/L (7-56)   08/02/21  04:37    


 


Alkaline Phosphatase  50 units/L ()   08/02/21  04:37    


 


Total Creatine Kinase  3416 units/L ()  H  07/31/21  22:30    


 


Total Protein  6.0 g/dL (6.3-8.2)  L D 08/02/21  04:37    


 


Albumin  3.7 g/dL (3.9-5)  L  08/02/21  04:37    


 


Albumin/Globulin Ratio  1.6 %  08/02/21  04:37    


 


Nasal Screen MRSA (PCR)  Negative  (Negative)   08/01/21  09:20    


 


Plasma/Serum Alcohol  < 0.01 % (0-0.07)   07/31/21  17:55    


 


Coronavirus (PCR)  Negative  (Negative)   08/01/21  09:20    











Calvo/IV: 


                                        





Voiding Method                   Toilet











Active Medications





- Current Medications


Current Medications: 














Generic Name Dose Route Start Last Admin





  Trade Name Freq  PRN Reason Stop Dose Admin


 


Acetaminophen  650 mg  08/01/21 00:27 





  Acetaminophen 325 Mg Tab  PO  





  Q4H PRN  





  Pain MILD(1-3)/Fever >100.5/HA  


 


Albuterol  2.5 mg  08/01/21 00:27 





  Albuterol 2.5 Mg/3 Ml Nebu  IH  





  Q4HRT PRN  





  Shortness Of Breath  


 


Famotidine  20 mg  08/01/21 10:00  08/01/21 22:00





  Famotidine 20 Mg Tab  PO   20 mg





  BID JULI   Administration


 


Fluoxetine HCl  10 mg  08/01/21 12:00  08/01/21 13:11





  Fluoxetine 10 Mg Tab  PO   10 mg





  QDAY JULI   Administration


 


Heparin Sodium (Porcine)  5,000 unit  08/01/21 10:00  08/02/21 08:25





  Heparin 5,000 Unit/1 Ml Vial  SUB-Q   Not Given





  Q12HR JULI  


 


Sodium Chloride  1,000 mls @ 125 mls/hr  08/01/21 16:00  08/01/21 16:19





  Nacl 0.9% 1000 Ml  IV   125 mls/hr





  AS DIRECT JULI   Administration


 


Olanzapine  5 mg  08/01/21 12:00  08/01/21 13:11





  Olanzapine 5 Mg Tab  PO   5 mg





  QDAY JULI   Administration


 


Ondansetron HCl  4 mg  08/01/21 00:27 





  Ondansetron 4 Mg/2 Ml Inj  IV  





  Q8H PRN  





  Nausea And Vomiting  


 


Potassium Chloride  40 meq  08/02/21 08:00 





  Potassium Chloride Er 20 Meq Tab  PO  08/02/21 11:00 





  ONCE JULI  


 


Sodium Chloride  10 ml  08/01/21 10:00  08/01/21 22:00





  Sodium Chloride 0.9% 10 Ml Flush Syringe  IV   10 ml





  BID JULI   Administration


 


Sodium Chloride  10 ml  08/01/21 00:27 





  Sodium Chloride 0.9% 10 Ml Flush Syringe  IV  





  PRN PRN  





  LINE FLUSH  


 


Trazodone HCl  50 mg  08/01/21 22:00  08/01/21 22:00





  Trazodone 50 Mg Tab  PO   50 mg





  QHS JULI   Administration














Nutrition/Malnutrition Assess





- Dietary Evaluation


Nutrition/Malnutrition Findings: 


                                        





Nutrition Notes                                            Start:  08/01/21 

11:03


Freq:                                                      Status: Active       




Protocol:                                                                       




 Document     08/01/21 11:03  FAUZIA  (Rec: 08/01/21 11:07  FAUZIA  [id number])


 Nutrition Notes


     Need for Assessment generated from:         MD Order,RN Referral,MST


     Initial or Follow up                        Assessment


     Current Diagnosis                           Acute Kidney Injury


     Other Pertinent Diagnosis                   AMS, rhabdomyolysis, psychosis


                                                 , dehydration, r/o COVID-19


     Current Diet                                regular


     Subjective/Other Information                MD order for ONS. RN screen


                                                 for MST. Unable to speak with


                                                 pt.


 Nutrition Intervention


     Follow-Up By:                               08/02/21


     Additional Comments                         F/u: assessment

## 2021-08-02 NOTE — PROGRESS NOTE
Assessment and Plan


(1) Rhabdomyolysis


(2) Acute psychosis


(3) Dehydration


(4) LFT elevation


(5) CIARRA (acute kidney injury)








CIARRA resolved


renally dose meds


strict I&O


daily weight





Asif Fajardo MD


[phone]








Subjective


Date of service: 08/02/21


Principal diagnosis: CIARRA


Interval history: 


no acute distress, comfortable 








Objective





- Vital Signs


Vital signs: 


                               Vital Signs - 12hr











  08/01/21 08/02/21 08/02/21





  23:37 03:28 05:24


 


Temperature 98.4 F 97.2 F L 


 


Pulse Rate 78 65 57 L


 


Respiratory 18 20 





Rate   


 


Blood Pressure 136/86 114/68 





[Right]   


 


O2 Sat by Pulse 97 97 





Oximetry   














- Lab





                                 08/02/21 04:37





                                 08/02/21 04:37


                             Most recent lab results











Calcium  8.8 mg/dL (8.4-10.2)   08/02/21  04:37    














Medications & Allergies





- Medications


Allergies/Adverse Reactions: 


                                    Allergies





pollen extracts Allergy (Mild, Verified 04/02/21 13:58)


   Unknown








Active Medications: 














Generic Name Dose Route Start Last Admin





  Trade Name Freq  PRN Reason Stop Dose Admin


 


Acetaminophen  650 mg  08/01/21 00:27 





  Acetaminophen 325 Mg Tab  PO  





  Q4H PRN  





  Pain MILD(1-3)/Fever >100.5/HA  


 


Albuterol  2.5 mg  08/01/21 00:27 





  Albuterol 2.5 Mg/3 Ml Nebu  IH  





  Q4HRT PRN  





  Shortness Of Breath  


 


Famotidine  20 mg  08/01/21 10:00  08/01/21 22:00





  Famotidine 20 Mg Tab  PO   20 mg





  BID JULI   Administration


 


Fluoxetine HCl  10 mg  08/01/21 12:00  08/01/21 13:11





  Fluoxetine 10 Mg Tab  PO   10 mg





  QDAY JULI   Administration


 


Heparin Sodium (Porcine)  5,000 unit  08/01/21 10:00  08/02/21 08:25





  Heparin 5,000 Unit/1 Ml Vial  SUB-Q   Not Given





  Q12HR JULI  


 


Sodium Chloride  1,000 mls @ 125 mls/hr  08/01/21 16:00  08/01/21 16:19





  Nacl 0.9% 1000 Ml  IV   125 mls/hr





  AS DIRECT JULI   Administration


 


Olanzapine  5 mg  08/01/21 12:00  08/01/21 13:11





  Olanzapine 5 Mg Tab  PO   5 mg





  QDAY JULI   Administration


 


Ondansetron HCl  4 mg  08/01/21 00:27 





  Ondansetron 4 Mg/2 Ml Inj  IV  





  Q8H PRN  





  Nausea And Vomiting  


 


Potassium Chloride  40 meq  08/02/21 08:00 





  Potassium Chloride Er 20 Meq Tab  PO  08/02/21 11:00 





  ONCE JULI  


 


Sodium Chloride  10 ml  08/01/21 10:00  08/01/21 22:00





  Sodium Chloride 0.9% 10 Ml Flush Syringe  IV   10 ml





  BID JULI   Administration


 


Sodium Chloride  10 ml  08/01/21 00:27 





  Sodium Chloride 0.9% 10 Ml Flush Syringe  IV  





  PRN PRN  





  LINE FLUSH  


 


Trazodone HCl  50 mg  08/01/21 22:00  08/01/21 22:00





  Trazodone 50 Mg Tab  PO   50 mg





  QHS JULI   Administration

## 2021-08-03 LAB
BASOPHILS # (AUTO): 0 K/MM3 (ref 0–0.1)
BASOPHILS NFR BLD AUTO: 0.7 % (ref 0–1.8)
BUN SERPL-MCNC: 9 MG/DL (ref 9–20)
BUN/CREAT SERPL: 8 %
CALCIUM SERPL-MCNC: 9.1 MG/DL (ref 8.4–10.2)
EOSINOPHIL # BLD AUTO: 0.1 K/MM3 (ref 0–0.4)
EOSINOPHIL NFR BLD AUTO: 3.4 % (ref 0–4.3)
HCT VFR BLD CALC: 41.4 % (ref 35.5–45.6)
HEMOLYSIS INDEX: 6
HGB BLD-MCNC: 14 GM/DL (ref 11.8–15.2)
LYMPHOCYTES # BLD AUTO: 1.4 K/MM3 (ref 1.2–5.4)
LYMPHOCYTES NFR BLD AUTO: 32.6 % (ref 13.4–35)
MCHC RBC AUTO-ENTMCNC: 34 % (ref 32–34)
MCV RBC AUTO: 87 FL (ref 84–94)
MONOCYTES # (AUTO): 0.3 K/MM3 (ref 0–0.8)
MONOCYTES % (AUTO): 6.8 % (ref 0–7.3)
PLATELET # BLD: 217 K/MM3 (ref 140–440)
RBC # BLD AUTO: 4.78 M/MM3 (ref 3.65–5.03)

## 2021-08-03 RX ADMIN — FAMOTIDINE SCH MG: 20 TABLET ORAL at 09:15

## 2021-08-03 RX ADMIN — FLUOXETINE SCH MG: 10 CAPSULE ORAL at 09:15

## 2021-08-03 RX ADMIN — FAMOTIDINE SCH MG: 20 TABLET ORAL at 23:30

## 2021-08-03 RX ADMIN — HEPARIN SODIUM SCH: 5000 INJECTION, SOLUTION INTRAVENOUS; SUBCUTANEOUS at 09:20

## 2021-08-03 RX ADMIN — HEPARIN SODIUM SCH UNIT: 5000 INJECTION, SOLUTION INTRAVENOUS; SUBCUTANEOUS at 23:31

## 2021-08-03 RX ADMIN — Medication SCH ML: at 09:15

## 2021-08-03 RX ADMIN — Medication SCH ML: at 23:31

## 2021-08-03 RX ADMIN — SODIUM CHLORIDE SCH MLS/HR: 0.9 INJECTION, SOLUTION INTRAVENOUS at 09:15

## 2021-08-03 NOTE — PROGRESS NOTE
Assessment and Plan


(1) Rhabdomyolysis


(2) Acute psychosis


(3) Dehydration


(4) LFT elevation


(5) CIARRA (acute kidney injury)








CIARRA resolved


will sign off, will repeat BMP as an outpatient within1-2 weeks


renally dose meds


strict I&O


daily weight





Asif Fajardo MD


[phone]








Subjective


Date of service: 08/03/21


Principal diagnosis: CIARRA


Interval history: 


no overnight events, ready to go home








Objective





- Vital Signs


Vital signs: 


                               Vital Signs - 12hr











  08/02/21 08/02/21 08/03/21





  22:00 23:29 03:55


 


Temperature  98.6 F 98.5 F


 


Pulse Rate 59 L 53 L 60


 


Respiratory  20 20





Rate   


 


Blood Pressure  108/69 99/59


 


O2 Sat by Pulse 99 97 97





Oximetry   














- General Appearance


General appearance: well-developed, well-nourished, appears stated age


EENT: ATNC, PERRL, mucous membranes moist


Neck: no JVD, no carotid bruit


Respiratory: Present: Clear to Ascultation.  Absent: Rales, Ronchi


Cardiology: regular, S1S2


Gastrointestinal: normoactive bowel sounds, no tenderness, no distended, no 

masses


Integumentary: no rash, warm and dry


Neurologic: no focal deficit, no asterixis, alert and oriented x3


Musculoskeletal: other (no edema in BLE)


Psychiatric: mood/affect appropriate, cooperative





- Lab





                                 08/03/21 04:30





                                 08/03/21 04:30


                             Most recent lab results











Calcium  9.1 mg/dL (8.4-10.2)   08/03/21  04:30    














Medications & Allergies





- Medications


Allergies/Adverse Reactions: 


                                    Allergies





pollen extracts Allergy (Mild, Verified 04/02/21 13:58)


   Unknown








Home Medications: 


                                Home Medications











 Medication  Instructions  Recorded  Confirmed  Last Taken  Type


 


Benztropine [Cogentin] 0.5 mg PO QHS 08/02/21 08/02/21 07/31/21 History


 


lamoTRIgine [LaMICtal] 100 mg PO QDAY 08/02/21 08/02/21 07/31/21 History











Active Medications: 














Generic Name Dose Route Start Last Admin





  Trade Name Freq  PRN Reason Stop Dose Admin


 


Acetaminophen  650 mg  08/01/21 00:27 





  Acetaminophen 325 Mg Tab  PO  





  Q4H PRN  





  Pain MILD(1-3)/Fever >100.5/HA  


 


Albuterol  2.5 mg  08/01/21 00:27 





  Albuterol 2.5 Mg/3 Ml Nebu  IH  





  Q4HRT PRN  





  Shortness Of Breath  


 


Famotidine  20 mg  08/01/21 10:00  08/02/21 22:26





  Famotidine 20 Mg Tab  PO   20 mg





  BID JULI   Administration


 


Fluoxetine HCl  10 mg  08/01/21 12:00  08/02/21 11:24





  Fluoxetine 10 Mg Tab  PO   10 mg





  QDAY JULI   Administration


 


Heparin Sodium (Porcine)  5,000 unit  08/01/21 10:00  08/02/21 22:26





  Heparin 5,000 Unit/1 Ml Vial  SUB-Q   5,000 unit





  Q12HR JULI   Administration


 


Sodium Chloride  1,000 mls @ 125 mls/hr  08/01/21 16:00  08/02/21 22:27





  Nacl 0.9% 1000 Ml  IV   125 mls/hr





  AS DIRECT JULI   Administration


 


Olanzapine  5 mg  08/01/21 12:00  08/02/21 11:24





  Olanzapine 5 Mg Tab  PO   5 mg





  QDAY JULI   Administration


 


Ondansetron HCl  4 mg  08/01/21 00:27 





  Ondansetron 4 Mg/2 Ml Inj  IV  





  Q8H PRN  





  Nausea And Vomiting  


 


Sodium Chloride  10 ml  08/01/21 10:00  08/02/21 22:26





  Sodium Chloride 0.9% 10 Ml Flush Syringe  IV   10 ml





  BID JULI   Administration


 


Sodium Chloride  10 ml  08/01/21 00:27 





  Sodium Chloride 0.9% 10 Ml Flush Syringe  IV  





  PRN PRN  





  LINE FLUSH  


 


Trazodone HCl  50 mg  08/01/21 22:00  08/02/21 22:26





  Trazodone 50 Mg Tab  PO   50 mg





  QHS JULI   Administration

## 2021-08-04 LAB
BASOPHILS # (AUTO): 0 K/MM3 (ref 0–0.1)
BASOPHILS NFR BLD AUTO: 1.1 % (ref 0–1.8)
BUN SERPL-MCNC: 9 MG/DL (ref 9–20)
BUN/CREAT SERPL: 8 %
CALCIUM SERPL-MCNC: 8.6 MG/DL (ref 8.4–10.2)
EOSINOPHIL # BLD AUTO: 0.1 K/MM3 (ref 0–0.4)
EOSINOPHIL NFR BLD AUTO: 3.6 % (ref 0–4.3)
HCT VFR BLD CALC: 42.1 % (ref 35.5–45.6)
HEMOLYSIS INDEX: 2
HGB BLD-MCNC: 14.3 GM/DL (ref 11.8–15.2)
LYMPHOCYTES # BLD AUTO: 1.5 K/MM3 (ref 1.2–5.4)
LYMPHOCYTES NFR BLD AUTO: 37.9 % (ref 13.4–35)
MCHC RBC AUTO-ENTMCNC: 34 % (ref 32–34)
MCV RBC AUTO: 85 FL (ref 84–94)
MONOCYTES # (AUTO): 0.3 K/MM3 (ref 0–0.8)
MONOCYTES % (AUTO): 7.1 % (ref 0–7.3)
PLATELET # BLD: 238 K/MM3 (ref 140–440)
RBC # BLD AUTO: 4.94 M/MM3 (ref 3.65–5.03)

## 2021-08-04 RX ADMIN — Medication SCH ML: at 22:30

## 2021-08-04 RX ADMIN — FLUOXETINE SCH MG: 10 CAPSULE ORAL at 10:22

## 2021-08-04 RX ADMIN — FAMOTIDINE SCH MG: 20 TABLET ORAL at 22:30

## 2021-08-04 RX ADMIN — FAMOTIDINE SCH MG: 20 TABLET ORAL at 10:22

## 2021-08-04 RX ADMIN — Medication SCH ML: at 10:23

## 2021-08-04 RX ADMIN — SODIUM CHLORIDE SCH MLS/HR: 0.9 INJECTION, SOLUTION INTRAVENOUS at 04:14

## 2021-08-04 NOTE — PROGRESS NOTE
Assessment and Plan





Assessment and Plan


Assessment and plan: 








(1) Rhabdomyolysis


Current Visit: Yes   Status: Acute   


Plan to address problem: 


Improved


CPK in mid 2000s





(2) Acute psychosis


Current Visit: Yes   Status: Acute   


Plan to address problem: 


Psychosis is improved


1030 may be revoked





(3) Dehydration


Current Visit: Yes   Status: Acute   


Plan to address problem: 


Half-normal saline at the rate of 125 cc/h.  We will recheck the CK in the 

morning.  Repeat BMP in the morning.  Nephrology consult








(4) LFT elevation


Current Visit: Yes   Status: Acute   


Plan to address problem: 


We will recheck the CMP in the morning if needed will consult GI








(5) CIARRA (acute kidney injury) due to vasomotor nephropathy


Current Visit: Yes   Status: Acute   


Plan to address problem: 


Improved





(6) DVT prophylaxis


Current Visit: Yes   Status: Acute   


Plan to address problem: 


Heparin 5000 units subcu every 12 hours for DVT prophylaxis.  Pepcid 20 mg p.o. 

twice daily for GI prophylaxis.  Patient is a full code











Subjective


Date of service: 08/03/21


Principal diagnosis: Rhabdomyolysis


Interval history: 





8/2/21 Patient is 29 yo with history of psychosis, bipolar disorder. He 

presented with altered mental status. He is diagnosed with acute psychosis, 

rhabdomyolysis, dehydration, acute kidney injury due to vasomotor nephropathy.


He is on 1013. He says he wants to go home, but needs Psych re-eval.  Follow 

daily Creatinine kinase for rhabdomyolysis. He is on iv fluids fror 

rhabdomyolysis and CIARRA. Cr improved 1.2 today.








History


Interval history: 


patient presented with altered mental status, diagnosed with acute psychosis, 

rhabdomyolysis,CIARRA


He feels better today.


He tells me he wants to go home,


patient on 1013





Objective





- Constitutional


Vitals: 


                               Vital Signs - 12hr











  08/03/21 08/03/21 08/03/21





  21:39 22:00 23:15


 


Temperature 98.0 F  98.4 F


 


Pulse Rate 49 L 52 L 58 L


 


Respiratory 20  18





Rate   


 


Blood Pressure 107/66  115/76


 


O2 Sat by Pulse 98 98 100





Oximetry   














  08/04/21 08/04/21





  04:19 07:38


 


Temperature 97.6 F 97.5 F L


 


Pulse Rate 64 88


 


Respiratory 18 18





Rate  


 


Blood Pressure 122/75 111/77


 


O2 Sat by Pulse 99 99





Oximetry  











General appearance: Present: no acute distress, well-nourished





- EENT


Eyes: PERRL, EOM intact


ENT: hearing intact, clear oral mucosa


Ears: bilateral: normal





- Neck


Neck: supple, normal ROM





- Respiratory


Respiratory effort: normal


Respiratory: bilateral: CTA





- Breasts


Breasts: normal





- Cardiovascular


Rhythm: regular


Heart Sounds: Present: S1 & S2.  Absent: gallop, rub


Extremities: pulses intact, No edema, normal color, Full ROM





- Gastrointestinal


General gastrointestinal: Present: soft, non-tender, non-distended, normal bowel

sounds





- Genitourinary


Male genitourinary: normal





- Integumentary


Integumentary: clear, warm, dry





- Musculoskeletal


Musculoskeletal: 1, strength equal bilaterally





- Neurologic


Neurologic: moves all extremities





- Psychiatric


Psychiatric: memory intact, appropriate mood/affect, intact judgment & insight





- Labs


CBC & Chem 7: 


                                 08/04/21 04:44





                                 08/04/21 04:44


Labs: 


                              Abnormal lab results











  08/04/21 08/04/21 Range/Units





  04:44 04:44 


 


WBC  3.9 L   (4.5-11.0)  K/mm3


 


Lymph % (Auto)  37.9 H   (13.4-35.0)  %


 


Potassium   3.5 L  (3.6-5.0)  mmol/L

## 2021-08-05 VITALS — DIASTOLIC BLOOD PRESSURE: 69 MMHG | SYSTOLIC BLOOD PRESSURE: 123 MMHG

## 2021-08-05 LAB
BASOPHILS # (AUTO): 0.1 K/MM3 (ref 0–0.1)
BASOPHILS NFR BLD AUTO: 1.2 % (ref 0–1.8)
BUN SERPL-MCNC: 11 MG/DL (ref 9–20)
BUN/CREAT SERPL: 9 %
CALCIUM SERPL-MCNC: 9.4 MG/DL (ref 8.4–10.2)
EOSINOPHIL # BLD AUTO: 0.2 K/MM3 (ref 0–0.4)
EOSINOPHIL NFR BLD AUTO: 3.4 % (ref 0–4.3)
HCT VFR BLD CALC: 41.6 % (ref 35.5–45.6)
HEMOLYSIS INDEX: 32
HGB BLD-MCNC: 14 GM/DL (ref 11.8–15.2)
LYMPHOCYTES # BLD AUTO: 1.6 K/MM3 (ref 1.2–5.4)
LYMPHOCYTES NFR BLD AUTO: 36.3 % (ref 13.4–35)
MCHC RBC AUTO-ENTMCNC: 34 % (ref 32–34)
MCV RBC AUTO: 86 FL (ref 84–94)
MONOCYTES # (AUTO): 0.3 K/MM3 (ref 0–0.8)
MONOCYTES % (AUTO): 7.2 % (ref 0–7.3)
PLATELET # BLD: 245 K/MM3 (ref 140–440)
RBC # BLD AUTO: 4.82 M/MM3 (ref 3.65–5.03)

## 2021-08-05 RX ADMIN — FLUOXETINE SCH MG: 10 CAPSULE ORAL at 10:46

## 2021-08-05 RX ADMIN — Medication SCH ML: at 10:46

## 2021-08-05 RX ADMIN — HEPARIN SODIUM SCH: 5000 INJECTION, SOLUTION INTRAVENOUS; SUBCUTANEOUS at 07:41

## 2021-08-05 RX ADMIN — HEPARIN SODIUM SCH: 5000 INJECTION, SOLUTION INTRAVENOUS; SUBCUTANEOUS at 10:45

## 2021-08-05 RX ADMIN — FAMOTIDINE SCH MG: 20 TABLET ORAL at 10:46

## 2021-08-05 NOTE — PROGRESS NOTE
Subjective





- Reason for Consult


Consult date: 08/05/21


Reason for consult: SI





- Chief Complaint


Chief complaint: 


The patient was seen today. He is sitting up watching t.v. He is more talkative 

and appears much better than the day before. His eye contact is a lot better. He

verbalizes feeling better and states "I was able to get rest, eat better, and 

the meds made me feel better too." He denies SI/HI or hallucinations of any 

kind. The patient states "I'm just ready to go home." 





REVIEW OF SYSTEMS


Constitutional: Negative for weight loss


ENT: Negative for stridor


Respiratory: Negative for cough or hemoptysis


All other systems reviewed and are negative


 


MENTAL STATUS EXAMINATION


General Appearance and Behavior: Age appropriate, good hygiene, wearing 

appropriate clothes, calm, cooperative, good eye contact


Cooperation: Participating, guarded


Psychomotor Behavior: normal


Mood: "better"


Affect and affective range: congruent with stated mood


Thought Process: goal oriented


Thought Content: None


Speech: Normal volume, Regular rate and rhythm, 


Suicidal Ideation: Denies


Homicidal Ideation: Denies 


Hallucinations: Denies


Delusions: None elicited


Impulse Control: Normal


Insight and Judgment: Limited insight and judgment


Memory: Limited


Attention: Limited


Orientation: Alert, oriented





 Assessment and Plan 


(1) Acute Psychosis





Treatment Plan


d/c 1013


Olanzapine 5mg po daily


Prozac 10mg po daily


Trazodone 50mg po qhs


Medical: Per primary


Sitter: Defer to primary


Disposition: Do not recommend acute psychiatric inpatient treatment. The patient

understands that if SI/HI are to arise he is to seek immediate assistance.


The  to further discuss safety plan.


The patient to follow up in 7 to 14 days with outpatient psychiatry upon 

discharge


Will sign off. Thanks.


Case staffed with Dr. Pederson. 








Mental Status Exam





- Vital signs


                                Last Vital Signs











Temp  98.2 F   08/05/21 09:30


 


Pulse  75   08/05/21 04:03


 


Resp  18   08/05/21 09:30


 


BP  121/71   08/05/21 09:30


 


Pulse Ox  98   08/05/21 09:30

## 2021-08-06 ENCOUNTER — HOSPITAL ENCOUNTER (EMERGENCY)
Dept: HOSPITAL 5 - ED | Age: 31
LOS: 1 days | Discharge: PSYCHIATRIC OR SUBSTANCE USE FACILITY | End: 2021-08-07
Payer: OTHER GOVERNMENT

## 2021-08-06 DIAGNOSIS — Z88.8: ICD-10-CM

## 2021-08-06 DIAGNOSIS — F29: Primary | ICD-10-CM

## 2021-08-06 DIAGNOSIS — Z20.822: ICD-10-CM

## 2021-08-06 DIAGNOSIS — Z79.899: ICD-10-CM

## 2021-08-06 LAB
BILIRUB UR QL STRIP: (no result)
BLOOD UR QL VISUAL: (no result)
MUCOUS THREADS #/AREA URNS HPF: (no result) /HPF
PH UR STRIP: 7 [PH] (ref 5–7)
PROT UR STRIP-MCNC: (no result) MG/DL
RBC #/AREA URNS HPF: 3 /HPF (ref 0–6)
UROBILINOGEN UR-MCNC: < 2 MG/DL (ref ?–2)
WBC #/AREA URNS HPF: < 1 /HPF (ref 0–6)

## 2021-08-06 PROCEDURE — U0003 INFECTIOUS AGENT DETECTION BY NUCLEIC ACID (DNA OR RNA); SEVERE ACUTE RESPIRATORY SYNDROME CORONAVIRUS 2 (SARS-COV-2) (CORONAVIRUS DISEASE [COVID-19]), AMPLIFIED PROBE TECHNIQUE, MAKING USE OF HIGH THROUGHPUT TECHNOLOGIES AS DESCRIBED BY CMS-2020-01-R: HCPCS

## 2021-08-06 PROCEDURE — 96372 THER/PROPH/DIAG INJ SC/IM: CPT

## 2021-08-06 PROCEDURE — 36415 COLL VENOUS BLD VENIPUNCTURE: CPT

## 2021-08-06 PROCEDURE — 81001 URINALYSIS AUTO W/SCOPE: CPT

## 2021-08-06 PROCEDURE — 80307 DRUG TEST PRSMV CHEM ANLYZR: CPT

## 2021-08-06 PROCEDURE — 85025 COMPLETE CBC W/AUTO DIFF WBC: CPT

## 2021-08-06 PROCEDURE — G0480 DRUG TEST DEF 1-7 CLASSES: HCPCS

## 2021-08-06 PROCEDURE — 80053 COMPREHEN METABOLIC PANEL: CPT

## 2021-08-06 PROCEDURE — 99285 EMERGENCY DEPT VISIT HI MDM: CPT

## 2021-08-06 PROCEDURE — 80320 DRUG SCREEN QUANTALCOHOLS: CPT

## 2021-08-06 NOTE — EMERGENCY DEPARTMENT REPORT
ED General Adult HPI





- General


Chief complaint: Psych


Stated complaint: MH EVAL


Time Seen by Provider: 08/06/21 17:33


Source: EMS


Mode of arrival: Ambulatory


Limitations: No Limitations





- History of Present Illness


Initial comments: 





Patient presents to the emergency department via EMS for psychosis.  The patient

was recently discharged from this facility for rhabdomyolysis and psychosis.  

The patient is responding to internal stimuli and is hyperverbal but is easily 

redirectable and easy to direct.


-: Sudden


Severity scale (0 -10): 0


Consistency: constant


Improves with: none


Worsens with: none


Associated Symptoms: denies other symptoms


Treatments Prior to Arrival: none





- Related Data


                                  Previous Rx's











 Medication  Instructions  Recorded  Last Taken  Type


 


Benztropine [Cogentin] 0.5 mg PO QHS #30 08/05/21 Unknown Rx


 


FLUoxetine HCL [Prozac] 10 mg PO DAILY #30 capsule 08/05/21 Unknown Rx


 


OLANZapine [Zyprexa] 5 mg PO DAILY #30 tablet 08/05/21 Unknown Rx


 


lamoTRIgine [LaMICtal] 100 mg PO QDAY #30 08/05/21 Unknown Rx


 


traZODone [Desyrel] 50 mg PO QHS #30 tab 08/05/21 Unknown Rx











                                    Allergies











Allergy/AdvReac Type Severity Reaction Status Date / Time


 


pollen extracts Allergy Mild Unknown Verified 04/02/21 13:58














ED Review of Systems


ROS: 


Stated complaint: MH EVAL


Other details as noted in HPI





Comment: All other systems reviewed and negative


Constitutional: denies: chills, fever


Eyes: denies: eye pain, eye discharge, vision change


ENT: denies: ear pain, throat pain


Respiratory: denies: cough, shortness of breath, wheezing


Cardiovascular: denies: chest pain, palpitations


Endocrine: no symptoms reported


Gastrointestinal: denies: abdominal pain, nausea, diarrhea


Genitourinary: denies: urgency, dysuria


Musculoskeletal: denies: back pain, joint swelling, arthralgia


Skin: denies: rash, lesions


Neurological: denies: headache, weakness, paresthesias


Psychiatric: denies: anxiety, depression, auditory hallucinations, visual 

hallucinations, homicidal thoughts, suicidal thoughts


Hematological/Lymphatic: denies: easy bleeding, easy bruising





ED Past Medical Hx





- Past Medical History


Hx Congestive Heart Failure: No


Hx Diabetes: No


Hx Asthma: No


Hx COPD: No


Hx HIV: No





- Social History


Smoking Status: Never Smoker


Substance Use Type: None





- Medications


Home Medications: 


                                Home Medications











 Medication  Instructions  Recorded  Confirmed  Last Taken  Type


 


Benztropine [Cogentin] 0.5 mg PO QHS #30 08/05/21  Unknown Rx


 


FLUoxetine HCL [Prozac] 10 mg PO DAILY #30 capsule 08/05/21  Unknown Rx


 


OLANZapine [Zyprexa] 5 mg PO DAILY #30 tablet 08/05/21  Unknown Rx


 


lamoTRIgine [LaMICtal] 100 mg PO QDAY #30 08/05/21  Unknown Rx


 


traZODone [Desyrel] 50 mg PO QHS #30 tab 08/05/21  Unknown Rx














ED Physical Exam





- General


Limitations: No Limitations


General appearance: alert, in no apparent distress, other (Psychotic)





- Head


Head exam: Present: atraumatic, normocephalic





- Eye


Eye exam: Present: normal appearance





- ENT


ENT exam: Present: mucous membranes moist





- Neck


Neck exam: Present: normal inspection





- Respiratory


Respiratory exam: Present: normal lung sounds bilaterally.  Absent: respiratory 

distress





- Cardiovascular


Cardiovascular Exam: Present: regular rate, normal rhythm.  Absent: systolic 

murmur, diastolic murmur, rubs, gallop





- GI/Abdominal


GI/Abdominal exam: Present: soft, normal bowel sounds.  Absent: distended, 

tenderness





- Rectal


Rectal exam: Present: deferred





- Extremities Exam


Extremities exam: Present: normal inspection





- Back Exam


Back exam: Present: normal inspection





- Neurological Exam


Neurological exam: Present: alert, oriented X3





- Psychiatric


Psychiatric exam: Present: other (The patient is psychotic and responding to 

internal stimuli)





- Skin


Skin exam: Present: warm, dry, intact, normal color.  Absent: rash





ED Course





                                   Vital Signs











  08/06/21





  17:37


 


Temperature 98.3 F


 


Pulse Rate 89


 


Respiratory 18





Rate 


 


Blood Pressure 126/82





[Left] 


 


O2 Sat by Pulse 99





Oximetry 














ED Medical Decision Making





- Medical Decision Making





1013 applied


Patient received Geodon and Ativan


Awaiting completion of blood work for medical clearance


Awaiting mental health evaluation


Awaiting placement


Critical care attestation.: 


If time is entered above; I have spent that time in minutes in the direct care 

of this critically ill patient, excluding procedure time.








ED Disposition


Clinical Impression: 


 Psychosis





Disposition: [id number]/TX-[facility] PS[facility] HOSP/PSY UNIT


Is pt being admited?: No


Does the pt Need Aspirin: No


Condition: Stable


Referrals: 


PRIMARY CARE,MD [Primary Care Provider] - 3-5 Days

## 2021-08-07 VITALS — DIASTOLIC BLOOD PRESSURE: 72 MMHG | SYSTOLIC BLOOD PRESSURE: 114 MMHG

## 2021-08-07 LAB
ALBUMIN SERPL-MCNC: 4.4 G/DL (ref 3.9–5)
ALT SERPL-CCNC: 81 UNITS/L (ref 7–56)
BASOPHILS # (AUTO): 0 K/MM3 (ref 0–0.1)
BASOPHILS NFR BLD AUTO: 0.2 % (ref 0–1.8)
BUN SERPL-MCNC: 14 MG/DL (ref 9–20)
BUN/CREAT SERPL: 9 %
CALCIUM SERPL-MCNC: 9.7 MG/DL (ref 8.4–10.2)
EOSINOPHIL # BLD AUTO: 0 K/MM3 (ref 0–0.4)
EOSINOPHIL NFR BLD AUTO: 0.7 % (ref 0–4.3)
HCT VFR BLD CALC: 44.3 % (ref 35.5–45.6)
HEMOLYSIS INDEX: 0
HGB BLD-MCNC: 15 GM/DL (ref 11.8–15.2)
LYMPHOCYTES # BLD AUTO: 1.3 K/MM3 (ref 1.2–5.4)
LYMPHOCYTES NFR BLD AUTO: 20.1 % (ref 13.4–35)
MCHC RBC AUTO-ENTMCNC: 34 % (ref 32–34)
MCV RBC AUTO: 86 FL (ref 84–94)
MONOCYTES # (AUTO): 0.4 K/MM3 (ref 0–0.8)
MONOCYTES % (AUTO): 6.1 % (ref 0–7.3)
PLATELET # BLD: 271 K/MM3 (ref 140–440)
RBC # BLD AUTO: 5.16 M/MM3 (ref 3.65–5.03)
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
## 2021-08-07 NOTE — EVENT NOTE
Date: 08/07/21








S: No events reported overnight





O: 


                                   Vital Signs











  08/06/21 08/06/21 08/07/21





  17:37 22:05 07:25


 


Temperature 98.3 F  


 


Pulse Rate 89  


 


Respiratory 18  





Rate   


 


Blood Pressure 126/82  





[Left]   


 


O2 Sat by Pulse 99 99 100





Oximetry   














  08/07/21





  08:27


 


Temperature 98.9 F


 


Pulse Rate 84


 


Respiratory 18





Rate 


 


Blood Pressure 112/77





[Left] 


 


O2 Sat by Pulse 98





Oximetry 














A: Psychosis


P: Awaiting inpatient psych placement

## 2022-07-17 ENCOUNTER — HOSPITAL ENCOUNTER (EMERGENCY)
Dept: HOSPITAL 5 - ED | Age: 32
LOS: 1 days | Discharge: PSYCHIATRIC OR SUBSTANCE USE FACILITY | End: 2022-07-18
Payer: SELF-PAY

## 2022-07-17 DIAGNOSIS — Z91.09: ICD-10-CM

## 2022-07-17 DIAGNOSIS — Z20.822: ICD-10-CM

## 2022-07-17 DIAGNOSIS — R45.850: Primary | ICD-10-CM

## 2022-07-17 PROCEDURE — 85025 COMPLETE CBC W/AUTO DIFF WBC: CPT

## 2022-07-17 PROCEDURE — G0480 DRUG TEST DEF 1-7 CLASSES: HCPCS

## 2022-07-17 PROCEDURE — 99285 EMERGENCY DEPT VISIT HI MDM: CPT

## 2022-07-17 PROCEDURE — 36415 COLL VENOUS BLD VENIPUNCTURE: CPT

## 2022-07-17 PROCEDURE — U0003 INFECTIOUS AGENT DETECTION BY NUCLEIC ACID (DNA OR RNA); SEVERE ACUTE RESPIRATORY SYNDROME CORONAVIRUS 2 (SARS-COV-2) (CORONAVIRUS DISEASE [COVID-19]), AMPLIFIED PROBE TECHNIQUE, MAKING USE OF HIGH THROUGHPUT TECHNOLOGIES AS DESCRIBED BY CMS-2020-01-R: HCPCS

## 2022-07-17 PROCEDURE — 96372 THER/PROPH/DIAG INJ SC/IM: CPT

## 2022-07-17 PROCEDURE — 81001 URINALYSIS AUTO W/SCOPE: CPT

## 2022-07-17 PROCEDURE — 80048 BASIC METABOLIC PNL TOTAL CA: CPT

## 2022-07-17 PROCEDURE — 80320 DRUG SCREEN QUANTALCOHOLS: CPT

## 2022-07-17 PROCEDURE — 80307 DRUG TEST PRSMV CHEM ANLYZR: CPT

## 2022-07-17 NOTE — EMERGENCY DEPARTMENT REPORT
ED General Adult HPI





- General


Chief complaint: Psych


Stated complaint: PSYCH


PUI?: No


Time Seen by Provider: 07/17/22 14:59


Source: patient, police


Mode of arrival: Ambulatory


Limitations: No Limitations





- History of Present Illness


Initial comments: 


This is a 31-year-old male who is a [employer]  with history of PTSD and curre

ntly a  brought in by police in handcuffs (not in police cousty) 

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 

sure if he was ever in combat because he rarely talks about his [employer] life.  Per 

firefighters, they noticed that patient was off since yesterday and likely not 

taking his medications as this has happened in the past as well and he became 

"manic".  The patient's mother informed both  and police that several

days ago, there was "motar" going off (?) and mother noticed since then, that's 

when patient seem off.





At the time of my evaluation, patient is in handcuff by police and appears 

internally agitated but is trying to keep his calmness.  When I approached the 

patient and thanked for his service as well as asking for his rank while in 

[facility]; patient states that it does not matter.  Patient said that he was in the 

[employer] for about 9 years.  Patient denies suicidal ideation but states that he is 

having homicidal thoughts/ideation.  When I asked who he wants to hurt, he 

states he wants to kill everyone who has lied to him but declined to detail 

further any specific person.  Patient states he is not on any medications vs his

colleagues/mother states he has not been taking his medications.  Patient denies

hallucination (auditory/visual/tactile).





- Related Data


                                  Previous Rx's











 Medication  Instructions  Recorded  Last Taken  Type


 


Benztropine [Cogentin] 0.5 mg PO QHS #30 08/05/21 Unknown Rx


 


FLUoxetine HCL [Prozac] 10 mg PO DAILY #30 capsule 08/05/21 Unknown Rx


 


OLANZapine [Zyprexa] 5 mg PO DAILY #30 tablet 08/05/21 Unknown Rx


 


lamoTRIgine [LaMICtal] 100 mg PO QDAY #30 08/05/21 Unknown Rx


 


traZODone [Desyrel] 50 mg PO QHS #30 tab 08/05/21 Unknown Rx











                                    Allergies











Allergy/AdvReac Type Severity Reaction Status Date / Time


 


pollen extracts Allergy Mild Unknown Verified 07/17/22 14:51














ED Review of Systems


ROS: 


Stated complaint: PSYCH


Other details as noted in HPI





Comment: All other systems reviewed and negative


Constitutional: no symptoms reported


Eyes: as per HPI


ENT: as per HPI


Respiratory: no symptoms reported


Cardiovascular: as per HPI


Endocrine: no symptoms reported


Gastrointestinal: as per HPI


Genitourinary: as per HPI


Musculoskeletal: as per HPI


Skin: as per HPI


Neurological: as per HPI


Psychiatric: homicidal thoughts.  denies: anxiety, depression, auditory 

hallucinations, visual hallucinations, suicidal thoughts


Hematological/Lymphatic: as per HPI





ED Past Medical Hx





- Past Medical History


Previous Medical History?: Yes


Hx Congestive Heart Failure: No


Hx Diabetes: No


Hx Asthma: No


Hx COPD: No


Hx HIV: No





- Social History


Smoking Status: Never Smoker


Substance Use Type: None





- Medications


Home Medications: 


                                Home Medications











 Medication  Instructions  Recorded  Confirmed  Last Taken  Type


 


Benztropine [Cogentin] 0.5 mg PO QHS #30 08/05/21 08/07/21 Unknown Rx


 


FLUoxetine HCL [Prozac] 10 mg PO DAILY #30 capsule 08/05/21 08/07/21 Unknown Rx


 


OLANZapine [Zyprexa] 5 mg PO DAILY #30 tablet 08/05/21 08/07/21 Unknown Rx


 


lamoTRIgine [LaMICtal] 100 mg PO QDAY #30 08/05/21 08/07/21 Unknown Rx


 


traZODone [Desyrel] 50 mg PO QHS #30 tab 08/05/21 08/07/21 Unknown Rx














ED Physical Exam





- General


Limitations: No Limitations


General appearance: alert, in distress





- Head


Head exam: Present: atraumatic, normocephalic, normal inspection





- Eye


Eye exam: Present: normal appearance, PERRL, EOMI


Pupils: Present: normal accommodation





- ENT


ENT exam: Present: normal exam, mucous membranes moist





- Neck


Neck exam: Present: normal inspection, full ROM





- Respiratory


Respiratory exam: Present: normal lung sounds bilaterally





- Cardiovascular


Cardiovascular Exam: Present: regular rate, normal rhythm, normal heart sounds





- GI/Abdominal


GI/Abdominal exam: Present: soft





- Extremities Exam


Extremities exam: Present: normal inspection, full ROM, normal capillary refill





- Back Exam


Back exam: Present: normal inspection, full ROM





- Neurological Exam


Neurological exam: Present: alert, oriented X3, CN II-XII intact, normal gait





- Psychiatric


Psychiatric exam: Present: agitated, manic, homicidal ideation





- Skin


Skin exam: Present: warm, normal color


Critical care attestation.: 


If time is entered above; I have spent that time in minutes in the direct care 

of this critically ill patient, excluding procedure time.








ED Disposition


Clinical Impression: 


 Homicidal ideations





Condition: Stable

## 2022-07-18 VITALS — DIASTOLIC BLOOD PRESSURE: 69 MMHG | SYSTOLIC BLOOD PRESSURE: 124 MMHG

## 2022-07-18 LAB
BACTERIA #/AREA URNS HPF: (no result) /HPF
BASOPHILS # (AUTO): 0 K/MM3 (ref 0–0.1)
BASOPHILS NFR BLD AUTO: 0.3 % (ref 0–1.8)
BILIRUB UR QL STRIP: (no result)
BLOOD UR QL VISUAL: (no result)
BUN SERPL-MCNC: 11 MG/DL (ref 9–20)
BUN/CREAT SERPL: 8 %
CALCIUM SERPL-MCNC: 10.1 MG/DL (ref 8.4–10.2)
EOSINOPHIL # BLD AUTO: 0.1 K/MM3 (ref 0–0.4)
EOSINOPHIL NFR BLD AUTO: 1.3 % (ref 0–4.3)
HCT VFR BLD CALC: 43.3 % (ref 35.5–45.6)
HEMOLYSIS INDEX: 6
HGB BLD-MCNC: 14.3 GM/DL (ref 11.8–15.2)
LYMPHOCYTES # BLD AUTO: 2 K/MM3 (ref 1.2–5.4)
LYMPHOCYTES NFR BLD AUTO: 32.1 % (ref 13.4–35)
MCHC RBC AUTO-ENTMCNC: 33 % (ref 32–34)
MCV RBC AUTO: 85 FL (ref 84–94)
MONOCYTES # (AUTO): 0.6 K/MM3 (ref 0–0.8)
MONOCYTES % (AUTO): 8.8 % (ref 0–7.3)
MUCOUS THREADS #/AREA URNS HPF: (no result) /HPF
PH UR STRIP: 6 [PH] (ref 5–7)
PLATELET # BLD: 298 K/MM3 (ref 140–440)
PROT UR STRIP-MCNC: (no result) MG/DL
RBC # BLD AUTO: 5.07 M/MM3 (ref 3.65–5.03)
RBC #/AREA URNS HPF: 1 /HPF (ref 0–6)
UROBILINOGEN UR-MCNC: < 2 MG/DL (ref ?–2)
WBC #/AREA URNS HPF: 1 /HPF (ref 0–6)

## 2022-07-18 NOTE — CONSULTATION
History of Present Illness





- Reason for Consult


Consult date: 07/18/22


Reason for consult: homicidal, off meds





- History of Present Psychiatric Illness


The patient was seen today. He is lying down. He says he was brought to the 

hospital because his mom "felt threatened." He says "she looked into my eyes and

felt something was off." The patient says he's been off his meds. I ask him why,

he says "I'm just not taking any meds." I explain to the patient the importance 

of not complying with his treatment regimen. He says "I'm not taking any meds." 

He denies SI, but does endorse homicidal thoughts. I ask him who and why does he

want to hurt anyone. He says "I want to kill everybody who has lied to me, 

mainly my mom." He then starts talking about his mom was supposed to had got 

guardianship and like about signing paperwork. The patient also states he was 

hearing voices at the time. He denies at present. 





Diagnoses: Bipolar, Schizophrenia


Suicide attempts or Self-harm behavior: Denies


Prior psychiatric hospitalizations: yes


Substance Abuse history: Denies


Previous psychiatric medications tried: Unknown


Outpatient treatment: Yes





PAST MEDICAL HISTORY: None reported





Family Psychiatric History: None reported or documented





SOCIAL HISTORY


Marital Status: single


Living Arrangements: live with mother


Employment Status: unemployed


Access to guns/weapons: Denies 


Education: Bachelors


History of Abuse: none reported


Legal History: none reported





REVIEW OF SYSTEMS


Constitutional: Negative for weight loss


ENT: Negative for stridor


Respiratory: Negative for cough or hemoptysis


All other systems reviewed and are negative


 


MENTAL STATUS EXAMINATION


General Appearance and Behavior: Age appropriate, good hygiene, wearing 

appropriate clothes, cooperative, cooperative


Cooperation: Participating/engaged


Psychomotor Behavior: normal


Mood: upset


Affect and affective range: congruent with stated mood


Thought Process: illogical


Thought Content: homicidal


Speech: Normal volume, Regular rate and rhythm


Suicidal Ideation: Denies


Homicidal Ideation: Yes


Hallucinations: Auditory yesterday


Delusions: None elicited


Impulse Control: impaired


Insight and Judgment: limited insight and judgment,


Memory: normal


Attention: Normal


Orientation: Alert, oriented





 Assessment and Plan 


(1) schizophrenia





Treatment Plan


1013


Zypreza 10mg po daily


Trazodone 50mg po qhs


Depakote DR 250mg po BID


Geodon 20mg IM q4h prn agitation


Sitter: Per primary


Medical: Per primary


Disposition: Recommend acute psychiatric inpatient treatment


Will follow. Thanks


Case staffed with Dr. Pederson








Medications and Allergies


                                    Allergies











Allergy/AdvReac Type Severity Reaction Status Date / Time


 


pollen extracts Allergy Mild Unknown Verified 07/17/22 14:51











                                Home Medications











 Medication  Instructions  Recorded  Confirmed  Last Taken  Type


 


Benztropine [Cogentin] 0.5 mg PO QHS #30 08/05/21 07/18/22 Unknown Rx


 


FLUoxetine HCL [Prozac] 10 mg PO DAILY #30 capsule 08/05/21 07/18/22 Unknown Rx


 


OLANZapine [Zyprexa] 5 mg PO DAILY #30 tablet 08/05/21 07/18/22 Unknown Rx


 


lamoTRIgine [LaMICtal] 100 mg PO QDAY #30 08/05/21 07/18/22 Unknown Rx


 


traZODone [Desyrel] 50 mg PO QHS #30 tab 08/05/21 07/18/22 Unknown Rx














Mental Status Exam





- Vital signs


                                Last Vital Signs











Temp      


 


Pulse  76   07/17/22 23:40


 


Resp  16   07/17/22 23:40


 


BP  127/68   07/17/22 23:40


 


Pulse Ox  98   07/17/22 23:41














Results


Result Diagrams: 


                                 07/17/22 23:39





                                 07/17/22 23:39


                              Abnormal lab results











  07/17/22 07/17/22 07/17/22 Range/Units





  23:39 23:39 23:39 


 


RBC    5.07 H  (3.65-5.03)  M/mm3


 


Mono % (Auto)    8.8 H  (0.0-7.3)  %


 


Salicylates  < 0.3 L    (2.8-20.0)  mg/dL


 


Acetaminophen   5.0 L   (10.0-30.0)  ug/mL








All other labs normal.

## 2022-07-18 NOTE — EVENT NOTE
Date: 07/18/22





vss , no events overnight medically cleared , awaiting transfer on inpatient 

[facility] facility